# Patient Record
Sex: FEMALE | Race: WHITE
[De-identification: names, ages, dates, MRNs, and addresses within clinical notes are randomized per-mention and may not be internally consistent; named-entity substitution may affect disease eponyms.]

---

## 2021-01-10 ENCOUNTER — HOSPITAL ENCOUNTER (EMERGENCY)
Dept: HOSPITAL 62 - ER | Age: 21
LOS: 1 days | Discharge: HOME | End: 2021-01-11
Payer: MEDICAID

## 2021-01-10 DIAGNOSIS — R07.9: Primary | ICD-10-CM

## 2021-01-10 DIAGNOSIS — F17.200: ICD-10-CM

## 2021-01-10 DIAGNOSIS — R06.02: ICD-10-CM

## 2021-01-10 DIAGNOSIS — F41.9: ICD-10-CM

## 2021-01-10 PROCEDURE — 93005 ELECTROCARDIOGRAM TRACING: CPT

## 2021-01-10 PROCEDURE — 93010 ELECTROCARDIOGRAM REPORT: CPT

## 2021-01-10 PROCEDURE — 71045 X-RAY EXAM CHEST 1 VIEW: CPT

## 2021-01-10 PROCEDURE — 84484 ASSAY OF TROPONIN QUANT: CPT

## 2021-01-10 PROCEDURE — 80053 COMPREHEN METABOLIC PANEL: CPT

## 2021-01-10 PROCEDURE — 85025 COMPLETE CBC W/AUTO DIFF WBC: CPT

## 2021-01-10 PROCEDURE — 99285 EMERGENCY DEPT VISIT HI MDM: CPT

## 2021-01-10 PROCEDURE — 36415 COLL VENOUS BLD VENIPUNCTURE: CPT

## 2021-01-11 VITALS — DIASTOLIC BLOOD PRESSURE: 66 MMHG | SYSTOLIC BLOOD PRESSURE: 98 MMHG

## 2021-01-11 LAB
ADD MANUAL DIFF: NO
ALBUMIN SERPL-MCNC: 4.2 G/DL (ref 3.5–5)
ALP SERPL-CCNC: 57 U/L (ref 38–126)
ANION GAP SERPL CALC-SCNC: 6 MMOL/L (ref 5–19)
AST SERPL-CCNC: 19 U/L (ref 14–36)
BASOPHILS # BLD AUTO: 0 10^3/UL (ref 0–0.2)
BASOPHILS NFR BLD AUTO: 0.2 % (ref 0–2)
BILIRUB DIRECT SERPL-MCNC: 0.1 MG/DL (ref 0–0.4)
BILIRUB SERPL-MCNC: 0.3 MG/DL (ref 0.2–1.3)
BUN SERPL-MCNC: 11 MG/DL (ref 7–20)
CALCIUM: 9.6 MG/DL (ref 8.4–10.2)
CHLORIDE SERPL-SCNC: 106 MMOL/L (ref 98–107)
CO2 SERPL-SCNC: 28 MMOL/L (ref 22–30)
EOSINOPHIL # BLD AUTO: 0.2 10^3/UL (ref 0–0.6)
EOSINOPHIL NFR BLD AUTO: 1.8 % (ref 0–6)
ERYTHROCYTE [DISTWIDTH] IN BLOOD BY AUTOMATED COUNT: 12.6 % (ref 11.5–14)
GLUCOSE SERPL-MCNC: 100 MG/DL (ref 75–110)
HCT VFR BLD CALC: 35.1 % (ref 36–47)
HGB BLD-MCNC: 12.3 G/DL (ref 12–15.5)
LYMPHOCYTES # BLD AUTO: 2.9 10^3/UL (ref 0.5–4.7)
LYMPHOCYTES NFR BLD AUTO: 29.2 % (ref 13–45)
MCH RBC QN AUTO: 31.5 PG (ref 27–33.4)
MCHC RBC AUTO-ENTMCNC: 34.9 G/DL (ref 32–36)
MCV RBC AUTO: 90 FL (ref 80–97)
MONOCYTES # BLD AUTO: 0.8 10^3/UL (ref 0.1–1.4)
MONOCYTES NFR BLD AUTO: 8.3 % (ref 3–13)
NEUTROPHILS # BLD AUTO: 5.9 10^3/UL (ref 1.7–8.2)
NEUTS SEG NFR BLD AUTO: 60.5 % (ref 42–78)
PLATELET # BLD: 224 10^3/UL (ref 150–450)
POTASSIUM SERPL-SCNC: 4.2 MMOL/L (ref 3.6–5)
PROT SERPL-MCNC: 7 G/DL (ref 6.3–8.2)
RBC # BLD AUTO: 3.89 10^6/UL (ref 3.72–5.28)
TOTAL CELLS COUNTED % (AUTO): 100 %
WBC # BLD AUTO: 9.8 10^3/UL (ref 4–10.5)

## 2021-01-11 NOTE — RADIOLOGY REPORT (SQ)
EXAM DESCRIPTION: 



XR CHEST 1 VIEW



COMPLETED DATE/TME:  01/11/2021 01:39



CLINICAL HISTORY: 



20 years, Female, chest pain



COMPARISON:

None.



NUMBER OF VIEWS:

1



TECHNIQUE:

Portable chest



LIMITATIONS:

None.



FINDINGS:



Heart size normal. Lungs clear. No pneumothorax



IMPRESSION:



Negative chest

 



copyright 2011 [employer]- All Rights Reserved

## 2021-01-11 NOTE — ER DOCUMENT REPORT
ED Cardiac





- General


Chief Complaint: Chest Pain


Stated Complaint: CHEST PAIN


Time Seen by Provider: 01/11/21 00:54


Mode of Arrival: Ambulatory


Information source: Patient


Notes: 





20-year-old female presents to the emergency room complaining of intermittent 

chest pain for the past 2 days.  States nothing makes it worse nothing makes it 

better.  Describes it as a sharp stabbing pain to the left side of her chest.  

Denies nausea, vomiting, no shortness of breath, no difficulty breathing.  No 

recent travel.  Not currently on birth control.  No family history of cardiac 

disease.  No history of DVT or PE.


TRAVEL OUTSIDE OF THE [place] IN LAST 30 DAYS: No





- Related Data


Allergies/Adverse Reactions: 


                                        





No Known Allergies Allergy (Unverified 01/11/21 01:02)


   








Home Medications: Hydrocodone, Ibuprofen





Past Medical History





- General


Information source: Patient - Patient vapes





- Social History


Smoking Status: Current Some Day Smoker


Frequency of alcohol use: Occasional


Drug Abuse: Marijuana


Family History: Reviewed & Not Pertinent


Patient has homicidal ideation: No


Past Surgical History: Reports: Hx Oral Surgery - wisdom teeth removal





Review of Systems





- Review of Systems


Constitutional: No symptoms reported


EENT: No symptoms reported


Cardiovascular: Chest pain


Respiratory: No symptoms reported


Gastrointestinal: No symptoms reported


Genitourinary: No symptoms reported


Musculoskeletal: No symptoms reported


Skin: No symptoms reported


Neurological/Psychological: No symptoms reported


-: Yes All other systems reviewed and negative





Physical Exam





- Vital signs


Vitals: 


                                        











Temp Pulse Resp BP Pulse Ox


 


 98.1 F   73   16   124/58 L  100 


 


 01/10/21 23:59  01/10/21 23:59  01/10/21 23:59  01/10/21 23:59  01/10/21 23:59














- Notes


Notes: 








GENERAL: Mild acute distress, non-toxic appearance.  


HEAD:  Normal with no signs of head trauma.


EYES:  PERRLA, EOMI, conjunctiva normal, no discharge.  


EARS:  Hearing grossly intact.


NOSE: Normal.


THROAT:  Oropharynx is normal. 


NECK:  Normal range of motion, no tenderness, supple, no lymphadenopathy, No 

adenopathy, no JVD.   


CHEST:  Clear breath sounds bilaterally.  No wheezes, rales, or rhonchi.  


CARDIAC:  Regular rate and rhythm.  S1 and S2, without murmurs, gallops, or 

rubs.


VASCULAR:  No Edema.  Peripheral pulses normal and equal in all extremities.


ABDOMEN: Normal and soft with no tenderness, no masses or pulsatile masses.  No 

organomegaly.  Positive bowel sounds x4.  No CVA tenderness noted bilaterally.


GASTROINTESTINAL: Bowel sounds normal


LYMPATHTIC:  No lymphadenopathy noted.


MUSCULOSKELETAL:  Good range of motion of all major joints. Extremities without 

clubbing, cyanosis or edema.  


NEUROLOGICAL:  Alert and oriented x 3.  No focal sensory or strength deficits.  

Speech normal.  Follows commands appropriately.


PSYCHIATRIC:  Normal Affect, judgement and mood.


SKIN:  Normal appearance with no rashes or lesions.





Course





- Re-evaluation


Re-evalutation: 





01/11/21 06:22


HEART Score:





History   0   


ECG   0   


Age   0   


Risk Factors   0


Troponin   0





Total:0





 If HEART score is equal to or less than 3 AND both tronponin measurments are 

normal, the 30 day risk of a major adverse cardiac event (all-cause mortality, 

myocardia infarction or need for coronary revscularization) is < 1% (Sensitivity

100%, %).








Chest pain in a patient without evidence of cardiac or other serious etiology on

workup today. I discussed with patient that, based on their age, risk factors 

and emergency department testing today, the likelihood that their symptoms are 

related to a heart attack is very low (estimated risk of heart attack or death 

over the next 30 days of less than 1%).  The patient demonstrates decision 

making capacity and has verbalized an understanding of these risks to me. Based 

on this,  the patient has chosen to follow-up as an outpatient. Usual chest pain

return precautions reviewed. The patient states understanding and agreement with

this plan.


01/11/21 06:23


Patient is resting comfortably she is pain-free at this time.  She is pain-free 

on exam.  Reviewed initial labs, EKG and chest x-ray with patient.  Aware due 

for repeat troponin if remains negative will be discharged home.


01/11/21 07:43


Patient is resting comfortably she remains pain-free.  Pain-free on exam.  Acute

coronary syndrome unlikely.  All test results were reviewed with the patient.  

Counseled to follow-up outpatient with a primary care physician if not improving

in 2 to 3 days.  On-call physician was provided.  Tylenol and or Motrin as 

needed for pain.  Patient was given strict return to the emergency room 

guidelines.  Return for any new or worsening symptoms.  All questions were 

answered.  Patient verbalized understanding and agrees with plan of care.


01/11/21 07:45








- Vital Signs


Vital signs: 


                                        











Temp Pulse Resp BP Pulse Ox


 


 98.1 F   73   14   98/66 L  99 


 


 01/11/21 06:01  01/10/21 23:59  01/11/21 07:01  01/11/21 07:01  01/11/21 07:01














- Laboratory Results


Result Diagrams: 


                                 01/11/21 01:06





                                 01/11/21 01:06


Laboratory Results Interpreted: 


                                        











  01/11/21





  01:06


 


Hct  35.1 L











Critical Laboratory Results Reviewed: No Critical Results





- Radiology Results


Critical Radiology Results Reviewed: No Critical Results





- EKG Interpretation by Me


EKG shows normal: Sinus rhythm


Additional EKG results interpreted by me: 





01/11/21 06:21


EKG was interpreted by ER physician Dr. Vila


No acute STEMI


Normal sinus rhythm


Rate of 81


Normal axis


No ST wave abnormalities


No previous EKGs for comparison





Discharge





- Discharge


Clinical Impression: 


Chest pain


Qualifiers:


 Chest pain type: unspecified Qualified Code(s): R07.9 - Chest pain, unspecified





Condition: Stable


Disposition: HOME, SELF-CARE


Instructions:  Chest Pain of Unclear Cause ([facility])


Additional Instructions: 


You were seen today for chest pain.  The exact cause of your pain is unclear. 

However, based on your cardiac enzyme testing, chest x-ray, and EKG it does not 

appear that it is from an immediately life-threatening cause at this time.  

Although your testing here is normal is critical that you follow-up with your 

primary care physician for continued evaluation of this chest pain and possible 

stress testing.  I recommended you see your physician within the next 24-48 

hours to be evaluated for consideration of a stress test.  Please return to 

emergency department immediately if you have worsening of your chest pain, sh

ortness of breath, vomiting, become unable to exert yourself due to pain or 

difficulty breathing, you pass out, or have any pain that radiates into your 

arms, jaw, or back. Please also return if you have any additional symptoms that 

are concerning to you.


Referrals: 


LUDMILA YUSUF MD [ACTIVE STAFF] - Follow up as needed

## 2021-01-11 NOTE — ER DOCUMENT REPORT
ED Medical Screen (RME)





- General


Stated Complaint: CHEST PAIN


Time Seen by Provider: 01/11/21 00:54





- HPI


Notes: 





Patient is a 20-year-old female with no medical history who presents with chest 

pain that began 2 days ago.  Patient reports shortness of breath and anxiety but

denies cough, fever, and vomiting.  Patient recently had her teeth extracted 4 

days ago and is currently taking Norco and ibuprofen for pain.  Patient states 

she believes she has had a panic attack in the past but states the pain today is

much worse.








Physical Exam





- Vital signs


Vitals: 





                                        











Temp Pulse Resp BP Pulse Ox


 


 98.1 F   73   16   124/58 L  100 


 


 01/10/21 23:59  01/10/21 23:59  01/10/21 23:59  01/10/21 23:59  01/10/21 23:59














- Respiratory


Respiratory status: No respiratory distress


Breath sounds: Normal





- Cardiovascular


Rhythm: Regular


Heart sounds: Normal auscultation





- Psychological


Associated symptoms: Anxious





Course





- Re-evaluation


Re-evalutation: 





I have greeted and performed a rapid initial assessment of this patient.  A 

comprehensive ED assessment and evaluation of the patient, analysis of test 

results and completion of medical decision making process will be conducted by 

an additional ED providers.








- Vital Signs


Vital signs: 





                                        











Temp Pulse Resp BP Pulse Ox


 


 98.1 F   73   16   124/58 L  100 


 


 01/10/21 23:59  01/10/21 23:59  01/10/21 23:59  01/10/21 23:59  01/10/21 23:59

## 2021-01-11 NOTE — EKG REPORT
SEVERITY:- OTHERWISE NORMAL ECG -

SINUS RHYTHM

LOW VOLTAGE IN FRONTAL LEADS

:

Confirmed by: Emil Ham 11-Jan-2021 07:35:28